# Patient Record
(demographics unavailable — no encounter records)

---

## 2024-11-14 NOTE — END OF VISIT
[] : Resident [Time Spent: ___ minutes] : I have spent [unfilled] minutes of time on the encounter which excludes teaching and separately reported services. stated

## 2024-11-14 NOTE — PROCEDURE
[Transvaginal OB Sonogram] : Transvaginal OB Sonogram [Intrauterine Pregnancy] : intrauterine pregnancy [Yolk Sac] : yolk sac present [Fetal Heart] : fetal heart present [CRL: ___ (mm)] : CRL - [unfilled]Umm [Current GA by Sonogram: ___ (wks)] : Current GA by Sonogram: [unfilled]Uwks [___ day(s)] : [unfilled] days [Transvaginal OB Sonogram WNL] : Transvaginal OB Sonogram WNL [FreeTextEntry1] : [place] 5w6d IUP

## 2024-11-14 NOTE — PLAN
[FreeTextEntry1] : Patient denies medical history of: Liver disease Renal failure Chronic adrenal failure Long term systemic corticosteroid use Anticoagulant use of hemorrhagic disorder Inherited porphyrias Anemia Respiratory disease Diabetes Breastfeeding Heart disease or hypertension Allergy to mifepristone, misoprostol or other prostaglandins  Options for the pregnancy were discussed with the patient, including continuation of pregnancy, medication , dilation and curettage (D&C) or manual vacuum aspiration (MVA) in the office under local anesthesia or D&C in the operating room under sedation. Given the pregnancy is undesired, the patient would prefer not to continue the pregnancy. They do not desire to continue the pregnancy and are requesting a medication .  The risk of harm to subsequent pregnancies or the ability to carry a subsequent child to term, and adverse psychological effects was discussed.  The risks of medication  including: - Cramping - Bleeding - Fever, diarrhea, nausea, vomiting, headache, dizziness, back pain, feeling tired - Emotional changes - Continuing pregnancy and the need for further medication or surgery - Blood clots in the uterus causing cramping and abdominal pain necessitation further medication or surgery - Incomplete  causing heavy bleeding, infection, or both (which may require other testing or treatments such as further medication or surgery) - Bleeding too much or too long which may require further treatment with medication or surgery, or a blood transfusion - Infection in the uterus, which may require further treatment with medication, surgery or antibiotics. It may also require hospital admission - Allergic reaction to any or all of the medications used - Death from any or all of the medications used - Seeing fetal tissue  1. Patient agrees to undergo surgical  if medication  fails.  2. The patient states they have a nearby hospital if the need for emergency services arises. They have access to a telephone, emergency medical care and a hospital.  3. The patient states they have someone to be with them at the time of the medication .  4. The patient is willing and able to follow up to confirm the pregnancy was successfully terminated.  The patient was thoroughly counseled on instructions for medication  and the warning signs of any problems. They were counseled that they may see fetal tissue and if they do not want to see any pregnancy tissue, surgical  is available. They voiced understanding of these warning signs and when to call and were provided with 24-hour contact information for on-call and available physicians. They were also informed that no promise can be made about the outcome of the  and that medication  is not reversible.  The patient also understands it is their responsibility to bring to the attention of their physician any unusual symptoms following the procedure and to report to follow-up phone calls and/or examinations.  They understand the need to call the office if they have no bleeding in 24 hours after misoprostol as this could mean either the medication  did not work, or something such as an ectopic pregnancy occurred.  The patient was informed there is no guarantee of success with medication  and they stated understanding.  The patient is sure of their decision and denies any coercion from family, friends or healthcare providers. The patient had the opportunity to ask questions and all questions were answered.  Medication   assessment/plan 1. Medication  - All consents signed today, all questions/concerns addressed  2. Scheduling - mifepristone consents signed, mifepristone information pamphlet given - Mifepristone 200 mg administered, lot #[id number]: exp: OCT 2027 - Misoprostol 800mcg administered to pt   3. Pain control - Reviewed ibuprofen 600 mg q6 hours  4. Blood type - RH testing or treatment not indicated <12 weeks per [facility] protocol  5. Contraception - Patient counseled on all contraceptive options - pt counseled on immediate return to fertility following their  - recommend condoms at minimum - Patient does not desire post  contraception  6. Post op - Pt to follow up in office in one week for TVUS and symptom check - Medication  instruction and information packet given, reviewed bleeding and infection precautions. 24 hour contact information reviewed and provided - all questions/concerns addressed  Deon De Luna, PGY2 Pt seen and d/w attending Dr. Gaona

## 2024-11-14 NOTE — PLAN
[FreeTextEntry1] : Patient denies medical history of: Liver disease Renal failure Chronic adrenal failure Long term systemic corticosteroid use Anticoagulant use of hemorrhagic disorder Inherited porphyrias Anemia Respiratory disease Diabetes Breastfeeding Heart disease or hypertension Allergy to mifepristone, misoprostol or other prostaglandins  Options for the pregnancy were discussed with the patient, including continuation of pregnancy, medication , dilation and curettage (D&C) or manual vacuum aspiration (MVA) in the office under local anesthesia or D&C in the operating room under sedation. Given the pregnancy is undesired, the patient would prefer not to continue the pregnancy. They do not desire to continue the pregnancy and are requesting a medication .  The risk of harm to subsequent pregnancies or the ability to carry a subsequent child to term, and adverse psychological effects was discussed.  The risks of medication  including: - Cramping - Bleeding - Fever, diarrhea, nausea, vomiting, headache, dizziness, back pain, feeling tired - Emotional changes - Continuing pregnancy and the need for further medication or surgery - Blood clots in the uterus causing cramping and abdominal pain necessitation further medication or surgery - Incomplete  causing heavy bleeding, infection, or both (which may require other testing or treatments such as further medication or surgery) - Bleeding too much or too long which may require further treatment with medication or surgery, or a blood transfusion - Infection in the uterus, which may require further treatment with medication, surgery or antibiotics. It may also require hospital admission - Allergic reaction to any or all of the medications used - Death from any or all of the medications used - Seeing fetal tissue  1. Patient agrees to undergo surgical  if medication  fails.  2. The patient states they have a nearby hospital if the need for emergency services arises. They have access to a telephone, emergency medical care and a hospital.  3. The patient states they have someone to be with them at the time of the medication .  4. The patient is willing and able to follow up to confirm the pregnancy was successfully terminated.  The patient was thoroughly counseled on instructions for medication  and the warning signs of any problems. They were counseled that they may see fetal tissue and if they do not want to see any pregnancy tissue, surgical  is available. They voiced understanding of these warning signs and when to call and were provided with 24-hour contact information for on-call and available physicians. They were also informed that no promise can be made about the outcome of the  and that medication  is not reversible.  The patient also understands it is their responsibility to bring to the attention of their physician any unusual symptoms following the procedure and to report to follow-up phone calls and/or examinations.  They understand the need to call the office if they have no bleeding in 24 hours after misoprostol as this could mean either the medication  did not work, or something such as an ectopic pregnancy occurred.  The patient was informed there is no guarantee of success with medication  and they stated understanding.  The patient is sure of their decision and denies any coercion from family, friends or healthcare providers. The patient had the opportunity to ask questions and all questions were answered.  Medication   assessment/plan 1. Medication  - All consents signed today, all questions/concerns addressed  2. Scheduling - mifepristone consents signed, mifepristone information pamphlet given - Mifepristone 200 mg administered, lot #[id number]: exp: OCT 2027 - Misoprostol 800mcg administered to pt   3. Pain control - Reviewed ibuprofen 600 mg q6 hours  4. Blood type - RH testing or treatment not indicated <12 weeks per [facility] protocol  5. Contraception - Patient counseled on all contraceptive options - pt counseled on immediate return to fertility following their  - recommend condoms at minimum - Patient does not desire post  contraception  6. Post op - Pt to follow up in office in one week for TVUS and symptom check - Medication  instruction and information packet given, reviewed bleeding and infection precautions. 24 hour contact information reviewed and provided - all questions/concerns addressed  Deon De Luna, PGY2 Pt seen and d/w attending Dr. Gaona

## 2024-11-14 NOTE — HISTORY OF PRESENT ILLNESS
[FreeTextEntry1] : [place] Davalos 34yo  at 5w5d by LMP (10/3/2024) presents for TOP. She reports unprotected sex with  while on vacation and took Plan B following day. However, was late for period on 10/31 and felt nausea, breast tenderness and UPT+. Otherwise has been relatively asymptomatic. Her and  hoping to conceive about one year from now and are sure they desire termination. Pt requesting medication TOP.

## 2024-11-20 NOTE — PHYSICAL EXAM
[Appropriately responsive] : appropriately responsive [Alert] : alert [No Acute Distress] : no acute distress [Soft] : soft [Non-tender] : non-tender [Oriented x3] : oriented x3 [Normal] : normal external genitalia [Chaperone Present] : A chaperone was present in the examining room during all aspects of the physical examination

## 2024-11-20 NOTE — DISCUSSION/SUMMARY
[FreeTextEntry1] : 34 yo s/p medication  one week ago 1.Medical  -Patient is recovering well.  No signs/symptoms of infection.  -TVUS with no findings of gestational sac -Pt denies symptoms of pregnancy -Reviewed that first period may be heavier than normal.   NS PGY2

## 2024-11-20 NOTE — PROCEDURE
[Transvaginal Ultrasound] : transvaginal ultrasound [L: ___ cm] : L: [unfilled] cm [W: ___cm] : W: [unfilled] cm [H: ___ cm] : H: [unfilled] cm [FreeTextEntry5] : endometrial thickness 6.29mm

## 2024-11-20 NOTE — HISTORY OF PRESENT ILLNESS
[FreeTextEntry1] : [place] Davalos 34yo female presents here for follow up from her medical . Patient took mifepristone on  and misoprostol on 11/15 and states she passed her pregnancy later that day. She has been bleeding since, she states it is like a normal period, going through 3-4 pads/day. She denies fever, chills, abdominal pain, nausea, vomiting, diarrhea or any other acute complaints at this time.